# Patient Record
Sex: FEMALE | Race: BLACK OR AFRICAN AMERICAN | NOT HISPANIC OR LATINO | Employment: STUDENT | URBAN - METROPOLITAN AREA
[De-identification: names, ages, dates, MRNs, and addresses within clinical notes are randomized per-mention and may not be internally consistent; named-entity substitution may affect disease eponyms.]

---

## 2018-07-07 ENCOUNTER — HOSPITAL ENCOUNTER (EMERGENCY)
Facility: HOSPITAL | Age: 23
Discharge: HOME | End: 2018-07-07
Attending: EMERGENCY MEDICINE
Payer: COMMERCIAL

## 2018-07-07 VITALS
HEIGHT: 69 IN | RESPIRATION RATE: 20 BRPM | OXYGEN SATURATION: 100 % | BODY MASS INDEX: 7.64 KG/M2 | SYSTOLIC BLOOD PRESSURE: 118 MMHG | TEMPERATURE: 98 F | HEART RATE: 91 BPM | WEIGHT: 51.56 LBS | DIASTOLIC BLOOD PRESSURE: 67 MMHG

## 2018-07-07 DIAGNOSIS — R10.13 ABDOMINAL PAIN, CHRONIC, EPIGASTRIC: Primary | ICD-10-CM

## 2018-07-07 DIAGNOSIS — G89.29 ABDOMINAL PAIN, CHRONIC, EPIGASTRIC: Primary | ICD-10-CM

## 2018-07-07 PROCEDURE — 99283 EMERGENCY DEPT VISIT LOW MDM: CPT

## 2018-07-07 PROCEDURE — 25000003 PHARM REV CODE 250: Performed by: EMERGENCY MEDICINE

## 2018-07-07 RX ORDER — FAMOTIDINE 20 MG/1
20 TABLET, FILM COATED ORAL
Status: COMPLETED | OUTPATIENT
Start: 2018-07-07 | End: 2018-07-07

## 2018-07-07 RX ADMIN — FAMOTIDINE 20 MG: 20 TABLET ORAL at 02:07

## 2018-07-07 RX ADMIN — LIDOCAINE HYDROCHLORIDE 50 ML: 20 SOLUTION ORAL; TOPICAL at 02:07

## 2018-07-07 NOTE — ED PROVIDER NOTES
"SCRIBE #1 NOTE: I, Devante Eagle, am scribing for, and in the presence of, Earnestine Coronel MD. I have scribed the entire note.      History      Chief Complaint   Patient presents with    Abdominal Pain     Pt states, "I have this pain in my stomach and lower back and I went to urgent care and they did a urine test and x ray and couldn't find anything so they told me to come here."       Review of patient's allergies indicates:  Allergies no known allergies     HPI   HPI    7/7/2018, 2:22 PM   History obtained from the patient      History of Present Illness: Evonne Caputo is a 23 y.o. female patient who presents to the Emergency Department for chronic epigastric abd pain which onset a year ago. Pt reports that she has been to multiple doctors, without any dx. She reports being sent to the ED from urgent care today. She states she was given a "clear" urine test, but did not bring results with her. Sxs are intermittent and moderate in severity. There are no mitigating or exacerbating factors noted. Associated sxs include back pain. Back pain has been chronic for 3 months. Pt denies any dysuria, hematuria, hematochezia, anal bleeding, n/v/d, constipation, fever, chills, saddle anesthesia, focal weakness/numbness, and all other sxs at this time. Pt denies attempting to treat sxs. No further complaints or concerns at this time.           Arrival mode: Personal vehicle    PCP: Primary Doctor No       Past Medical History:  History reviewed. Nothing pertinent.     Past Surgical History:  History reviewed. Nothing pertinent.    Family History:  History reviewed. Nothing pertinent.    Social History:  Social History     Social History Main Topics    Smoking status: Unknown    Smokeless tobacco: Unknown    Alcohol use Unknown    Drug use: Unknown    Sexual activity: Unknown       ROS   Review of Systems   Constitutional: Negative for chills and fever.   HENT: Negative for sore throat.  "   Respiratory: Negative for shortness of breath.    Cardiovascular: Negative for chest pain.   Gastrointestinal: Positive for abdominal pain (epigastric). Negative for anal bleeding, blood in stool, constipation, diarrhea, nausea and vomiting.   Genitourinary: Negative for difficulty urinating, dysuria and hematuria.   Musculoskeletal: Positive for back pain.   Skin: Negative for rash.   Neurological: Negative for weakness and numbness.        (-) Saddle anesthesia   Hematological: Does not bruise/bleed easily.   All other systems reviewed and are negative.      Physical Exam      Initial Vitals [07/07/18 1416]   BP Pulse Resp Temp SpO2   129/76 93 20 98 °F (36.7 °C) 99 %      MAP       --          Physical Exam  Nursing Notes and Vital Signs Reviewed.  Constitutional: Patient is in no acute distress. Well-developed and well-nourished.  Head: Atraumatic. Normocephalic.  Eyes: PERRL. EOM intact. Conjunctivae are not pale. No scleral icterus.  ENT: Mucous membranes are moist. Oropharynx is clear and symmetric.    Neck: Supple. Full ROM. No lymphadenopathy.  Cardiovascular: Regular rate. Regular rhythm. No murmurs, rubs, or gallops. Distal pulses are 2+ and symmetric.  Pulmonary/Chest: No respiratory distress. Clear to auscultation bilaterally. No wheezing or rales.  Abdominal: Soft and non-distended.  There is no tenderness.  No rebound, guarding, or rigidity. Good bowel sounds. Benign exam.  Back: There is no tenderness. No midline bony tenderness, deformities, or step-offs of the T-spine or L-spine.   Genitourinary: No CVA tenderness  Musculoskeletal: Moves all extremities. No obvious deformities. No edema. No calf tenderness.  Skin: Warm and dry.  Neurological:  Alert, awake, and appropriate.  Normal speech.  No acute focal neurological deficits are appreciated.  Psychiatric: Normal affect. Good eye contact. Appropriate in content.    ED Course    Procedures  ED Vital Signs:  Vitals:    07/07/18 1416   BP: 129/76  "  Pulse: 93   Resp: 20   Temp: 98 °F (36.7 °C)   TempSrc: Oral   SpO2: 99%   Weight: 23.4 kg (51 lb 9.4 oz)   Height: 5' 9" (1.753 m)       Abnormal Lab Results:  Labs Reviewed - No data to display     All Lab Results:  No results found for this or any previous visit.      Imaging Results:  Imaging Results    None                 The Emergency Provider reviewed the vital signs and test results, which are outlined above.    ED Discussion     2:45 PM: Spoke with Urgent Care on the phone. Pt's UPT was negative.      2:55 PM: Reassessed pt at this time. Pt states her condition has improved at this time. Discussed with pt all pertinent ED information and results. Discussed pt dx and plan of tx. Gave pt all f/u and return to the ED instructions. All questions and concerns were addressed at this time. Pt expresses understanding of information and instructions, and is comfortable with plan to discharge. Pt is stable for discharge.    I discussed with patient and/or family/caretaker that evaluation in the ED does not suggest any emergent or life threatening medical conditions requiring immediate intervention beyond what was provided in the ED, and I believe patient is safe for discharge.  Regardless, an unremarkable evaluation in the ED does not preclude the development or presence of a serious of life threatening condition. As such, patient was instructed to return immediately for any worsening or change in current symptoms.      ED Medication(s):  Medications   famotidine tablet 20 mg (20 mg Oral Given 7/7/18 1441)   GI cocktail (mylanta 30 mL, lidocaine 2 % viscous 10 mL, dicyclomine 10 mL) 50 mL (50 mLs Oral Given 7/7/18 1442)       New Prescriptions    No medications on file       Follow-up Information     [facility]. Schedule an appointment as soon as possible for a visit in 2 days.    Specialty:  Gastroenterology  Why:  Return to the Emergency Room, If symptoms worsen  Contact information:  [address] [address] " [facility] [address]  [phone]                   Medical Decision Making              Scribe Attestation:   Scribe #1: I performed the above scribed service and the documentation accurately describes the services I performed. I attest to the accuracy of the note.    Attending:   Physician Attestation Statement for Scribe #1: I, Earnestine Coronel MD, personally performed the services described in this documentation, as scribed by Devante Eagle, in my presence, and it is both accurate and complete.          Clinical Impression       ICD-10-CM ICD-9-CM   1. Abdominal pain, chronic, epigastric R10.13 789.06    G89.29 338.29       Disposition:   Disposition: Discharged  Condition: Stable         Earnestine Coronel MD  07/08/18 0650

## 2018-07-09 ENCOUNTER — HOSPITAL ENCOUNTER (OUTPATIENT)
Dept: RADIOLOGY | Facility: HOSPITAL | Age: 23
Discharge: HOME | End: 2018-07-09
Attending: INTERNAL MEDICINE
Payer: COMMERCIAL

## 2018-07-09 ENCOUNTER — OFFICE VISIT (OUTPATIENT)
Dept: GASTROENTEROLOGY | Facility: CLINIC | Age: 23
End: 2018-07-09
Payer: COMMERCIAL

## 2018-07-09 VITALS
WEIGHT: 119.25 LBS | HEART RATE: 78 BPM | HEIGHT: 69 IN | SYSTOLIC BLOOD PRESSURE: 116 MMHG | BODY MASS INDEX: 17.66 KG/M2 | DIASTOLIC BLOOD PRESSURE: 64 MMHG

## 2018-07-09 DIAGNOSIS — R10.13 EPIGASTRIC PAIN: ICD-10-CM

## 2018-07-09 DIAGNOSIS — R10.13 EPIGASTRIC PAIN: Primary | ICD-10-CM

## 2018-07-09 PROCEDURE — 99244 OFF/OP CNSLTJ NEW/EST MOD 40: CPT | Mod: S$GLB,,, | Performed by: INTERNAL MEDICINE

## 2018-07-09 PROCEDURE — 76705 ECHO EXAM OF ABDOMEN: CPT | Mod: TC

## 2018-07-09 PROCEDURE — 99999 PR PBB SHADOW E&M-EST. PATIENT-LVL III: CPT | Mod: PBBFAC,,, | Performed by: INTERNAL MEDICINE

## 2018-07-09 RX ORDER — LEVOTHYROXINE SODIUM 125 UG/1
125 TABLET ORAL DAILY
COMMUNITY

## 2018-07-09 NOTE — PATIENT INSTRUCTIONS
Epigastric pain  -     Case request GI: ESOPHAGOGASTRODUODENOSCOPY (EGD)  -     CBC auto differential; Future; Expected date: 07/09/2018  -     Basic metabolic panel; Future; Expected date: 07/09/2018  -     Hepatic function panel; Future; Expected date: 07/09/2018  -     US Abdomen Limited (GALLBLADDER); Future; Expected date: 07/09/2018      Thanks for trusting us with your healthcare needs and using [facility]. If you want to ask us a question, you can do so by replying to this message or by calling [phone].    Sincerely,    Roberto Araiza M.D.      To rate your experience with Dr. Araiza please click on the link below:    [email or web address]

## 2018-07-09 NOTE — LETTER
July 9, 2018      Earnestine Coronel MD  [id number] [place] [address] [address] [place] [address]           [facility]  [address] [facility] [address] [place] [address]  Phone: [phone]  Fax: [phone]          Patient: Evonne Caputo   MR Number: [id number]   YOB: 1995   Date of Visit: 7/9/2018       Dear Dr. Earnestine Coronel:    Thank you for referring Evonne Caputo to me for evaluation. Attached you will find relevant portions of my assessment and plan of care.    If you have questions, please do not hesitate to call me. I look forward to following Evonne Caputo along with you.    Sincerely,    Roberto Araiza MD    Enclosure  CC:  No Recipients    If you would like to receive this communication electronically, please contact [email or web address] or [phone] to request more information on [employer] Link access.    For providers and/or their staff who would like to refer a patient to [facility], please contact us through our one-stop-shop provider referral line, [facility], at [phone].    If you feel you have received this communication in error or would no longer like to receive these types of communications, please e-mail [email or web address]

## 2018-07-09 NOTE — PROGRESS NOTES
[facility] [place]  Gastroenterology    Patient evaluated at the request of Earnestine Coronel MD  [id number] [place] [address] [place] [address]    PCP: Primary Doctor No    7/9/18      Subjective:   Abdominal Pain: Patient complains of abdominal pain. The pain is located in the epigastrium. The pain is described as sharp, and is 7/10 in intensity. Onset was 1 year ago. Symptoms have been unchanged since. Aggravating factors include movement.  Alleviating factors include recumbency. Associated symptoms include constipation and nausea. The patient denies anorexia, arthralgias, belching, chills, diarrhea, fever, headache, hematochezia, melena and vomiting. Symptoms improve with recumbency.      Past Medical History:   Diagnosis Date    Hashimoto's disease        No past surgical history on file.    No current outpatient prescriptions on file prior to visit.     No current facility-administered medications on file prior to visit.        Review of patient's allergies indicates:  No Known Allergies    Social History     Social History    Marital status: Single     Spouse name: N/A    Number of children: N/A    Years of education: N/A     Occupational History    Not on file.     Social History Main Topics    Smoking status: Never Smoker    Smokeless tobacco: Never Used    Alcohol use Yes      Comment: occasional     Drug use: No    Sexual activity: Yes     Other Topics Concern    Not on file     Social History Narrative    No narrative on file       Family History   Problem Relation Age of Onset    Colon cancer Neg Hx     Stomach cancer Neg Hx      Review of Systems   Constitutional: Negative for chills, fever, malaise/fatigue and weight loss.   HENT: Negative for congestion, hearing loss, nosebleeds, sinus pain and sore throat.    Eyes: Negative for blurred vision, double vision, pain and redness.   Respiratory: Negative for hemoptysis, sputum production, shortness of breath and wheezing.     Cardiovascular: Negative for chest pain, palpitations, orthopnea, claudication and leg swelling.   Gastrointestinal: Positive for abdominal pain and nausea. Negative for blood in stool, constipation, diarrhea, heartburn, melena and vomiting.   Genitourinary: Negative for dysuria, frequency, hematuria and urgency.   Musculoskeletal: Negative for back pain, joint pain, myalgias and neck pain.   Skin: Negative for itching and rash.   Neurological: Positive for weakness. Negative for dizziness, tingling, tremors, sensory change, seizures and headaches.   Endo/Heme/Allergies: Negative for environmental allergies and polydipsia. Does not bruise/bleed easily.   Psychiatric/Behavioral: Negative for depression, hallucinations, memory loss and suicidal ideas. The patient is not nervous/anxious.        Objective:   Vitals:   Vitals:    07/09/18 1124   BP: 116/64   Pulse: 78       Physical Exam   Constitutional: She is oriented to person, place, and time. She appears well-developed and well-nourished.   HENT:   Head: Normocephalic and atraumatic.   Eyes: EOM are normal. Pupils are equal, round, and reactive to light.   Neck: Normal range of motion. Neck supple. No thyromegaly present.   Cardiovascular: Normal rate, regular rhythm, normal heart sounds and intact distal pulses.    No murmur heard.  Pulmonary/Chest: Effort normal and breath sounds normal. No respiratory distress. She has no wheezes. She has no rales.   Abdominal: Soft. Bowel sounds are normal. She exhibits no distension and no mass. There is no tenderness.   Musculoskeletal: Normal range of motion. She exhibits no edema or tenderness.   Lymphadenopathy:     She has no cervical adenopathy.   Neurological: She is alert and oriented to person, place, and time. She has normal reflexes. No cranial nerve deficit.   Skin: Skin is warm and dry. No erythema.   Psychiatric: She has a normal mood and affect. Her behavior is normal.         IMPRESSION     Problem List Items  Addressed This Visit     None      Visit Diagnoses     Epigastric pain    -  Primary    Relevant Orders    Case request GI: ESOPHAGOGASTRODUODENOSCOPY (EGD) (Completed)    CBC auto differential    Basic metabolic panel    Hepatic function panel    US Abdomen Limited (GALLBLADDER)          PLANS:    Epigastric pain  -     Case request GI: ESOPHAGOGASTRODUODENOSCOPY (EGD)  -     CBC auto differential; Future; Expected date: 07/09/2018  -     Basic metabolic panel; Future; Expected date: 07/09/2018  -     Hepatic function panel; Future; Expected date: 07/09/2018  -     US Abdomen Limited (GALLBLADDER); Future; Expected date: 07/09/2018      Risks, benefits and alternative options were discussed with the patient. Risks including but not limited to infection, bleeding, heart or respiratory problems and perforation that may require surgery were all explained to the patient. The patient had a chance for questions if there were doubts or concerns about the test. The referring provider will be notified that our consultation is complete and available through the patient's records.    Thanks for letting us participate in the care of this nice patient,      Roberto Araiza

## 2018-07-12 PROBLEM — R10.13 EPIGASTRIC PAIN: Status: ACTIVE | Noted: 2018-07-12

## 2018-07-12 RX ORDER — SODIUM CHLORIDE 0.9 % (FLUSH) 0.9 %
3 SYRINGE (ML) INJECTION
Status: CANCELLED | OUTPATIENT
Start: 2018-07-12

## 2018-07-13 ENCOUNTER — HOSPITAL ENCOUNTER (OUTPATIENT)
Facility: HOSPITAL | Age: 23
Discharge: HOME | End: 2018-07-13
Attending: INTERNAL MEDICINE | Admitting: INTERNAL MEDICINE
Payer: COMMERCIAL

## 2018-07-13 ENCOUNTER — ANESTHESIA (OUTPATIENT)
Dept: ENDOSCOPY | Facility: HOSPITAL | Age: 23
End: 2018-07-13
Payer: COMMERCIAL

## 2018-07-13 ENCOUNTER — ANESTHESIA EVENT (OUTPATIENT)
Dept: ENDOSCOPY | Facility: HOSPITAL | Age: 23
End: 2018-07-13
Payer: COMMERCIAL

## 2018-07-13 ENCOUNTER — SURGERY (OUTPATIENT)
Age: 23
End: 2018-07-13

## 2018-07-13 DIAGNOSIS — R10.9 ABDOMINAL PAIN: ICD-10-CM

## 2018-07-13 DIAGNOSIS — R10.13 EPIGASTRIC PAIN: Primary | ICD-10-CM

## 2018-07-13 LAB
B-HCG UR QL: NEGATIVE
CTP QC/QA: YES

## 2018-07-13 PROCEDURE — 88305 TISSUE EXAM BY PATHOLOGIST: CPT | Performed by: PATHOLOGY

## 2018-07-13 PROCEDURE — 43239 EGD BIOPSY SINGLE/MULTIPLE: CPT | Mod: ,,, | Performed by: INTERNAL MEDICINE

## 2018-07-13 PROCEDURE — 88305 TISSUE EXAM BY PATHOLOGIST: CPT | Mod: 26,,, | Performed by: PATHOLOGY

## 2018-07-13 PROCEDURE — 63600175 PHARM REV CODE 636 W HCPCS: Performed by: NURSE ANESTHETIST, CERTIFIED REGISTERED

## 2018-07-13 PROCEDURE — 25000003 PHARM REV CODE 250: Performed by: INTERNAL MEDICINE

## 2018-07-13 PROCEDURE — 27201012 HC FORCEPS, HOT/COLD, DISP: Performed by: INTERNAL MEDICINE

## 2018-07-13 PROCEDURE — 25000003 PHARM REV CODE 250: Performed by: NURSE ANESTHETIST, CERTIFIED REGISTERED

## 2018-07-13 PROCEDURE — 81025 URINE PREGNANCY TEST: CPT | Performed by: INTERNAL MEDICINE

## 2018-07-13 PROCEDURE — 43239 EGD BIOPSY SINGLE/MULTIPLE: CPT | Performed by: INTERNAL MEDICINE

## 2018-07-13 PROCEDURE — 37000008 HC ANESTHESIA 1ST 15 MINUTES: Performed by: INTERNAL MEDICINE

## 2018-07-13 RX ORDER — LIDOCAINE HYDROCHLORIDE 10 MG/ML
INJECTION INFILTRATION; PERINEURAL
Status: DISCONTINUED | OUTPATIENT
Start: 2018-07-13 | End: 2018-07-13

## 2018-07-13 RX ORDER — PROPOFOL 10 MG/ML
VIAL (ML) INTRAVENOUS
Status: DISCONTINUED | OUTPATIENT
Start: 2018-07-13 | End: 2018-07-13

## 2018-07-13 RX ORDER — SODIUM CHLORIDE, SODIUM LACTATE, POTASSIUM CHLORIDE, CALCIUM CHLORIDE 600; 310; 30; 20 MG/100ML; MG/100ML; MG/100ML; MG/100ML
INJECTION, SOLUTION INTRAVENOUS CONTINUOUS
Status: DISCONTINUED | OUTPATIENT
Start: 2018-07-13 | End: 2018-07-13 | Stop reason: HOSPADM

## 2018-07-13 RX ADMIN — PROPOFOL 100 MG: 10 INJECTION, EMULSION INTRAVENOUS at 03:07

## 2018-07-13 RX ADMIN — SODIUM CHLORIDE, SODIUM LACTATE, POTASSIUM CHLORIDE, AND CALCIUM CHLORIDE: .6; .31; .03; .02 INJECTION, SOLUTION INTRAVENOUS at 02:07

## 2018-07-13 RX ADMIN — LIDOCAINE HYDROCHLORIDE 40 MG: 10 INJECTION, SOLUTION INFILTRATION; PERINEURAL at 03:07

## 2018-07-13 NOTE — ANESTHESIA POSTPROCEDURE EVALUATION
"Anesthesia Post Evaluation    Patient: Evonne Caputo    Procedure(s) Performed: Procedure(s) (LRB):  ESOPHAGOGASTRODUODENOSCOPY (EGD) (Left)    Final Anesthesia Type: MAC  Patient location during evaluation: GI PACU  Patient participation: Yes- Able to Participate  Level of consciousness: awake and alert and oriented  Post-procedure vital signs: reviewed and stable  Pain management: adequate  Airway patency: patent  PONV status at discharge: No PONV  Anesthetic complications: no      Cardiovascular status: hemodynamically stable and blood pressure returned to baseline  Respiratory status: unassisted, spontaneous ventilation and room air  Hydration status: euvolemic  Follow-up not needed.        Visit Vitals  /86   Pulse 75   Temp 36.4 °C (97.5 °F) (Oral)   Resp 16   Ht 5' 9" (1.753 m)   Wt 52.6 kg (116 lb)   LMP 06/20/2018   SpO2 100%   Breastfeeding? No   BMI 17.13 kg/m²       Pain/Lary Score: Pain Assessment Performed: Yes (7/13/2018  2:48 PM)  Presence of Pain: denies (7/13/2018  2:48 PM)      "

## 2018-07-13 NOTE — [employer] PATIENT INSTRUCTIONS
Discharge Summary/Instructions after an Endoscopic Procedure  Patient Name: Evonne Caputo  Patient MRN: [id number]  Patient YOB: 1995 Friday, July 13, 2018 Roberto Araiza MD  RESTRICTIONS:  During your procedure today, you received medications for sedation.  These   medications may affect your judgment, balance and coordination.  Therefore,   for 24 hours, you have the following restrictions:   - DO NOT drive a car, operate machinery, make legal/financial decisions,   sign important papers or drink alcohol.    ACTIVITY:  Today: no heavy lifting, straining or running due to procedural   sedation/anesthesia.  The following day: return to full activity including work.  DIET:  Eat and drink normally unless instructed otherwise.     TREATMENT FOR COMMON SIDE EFFECTS:  - Mild abdominal pain, nausea, belching, bloating or excessive gas:  rest,   eat lightly and use a heating pad.  - Sore Throat: treat with throat lozenges and/or gargle with warm salt   water.  - Because air was used during the procedure, expelling large amounts of air   from your rectum or belching is normal.  - If a bowel prep was taken, you may not have a bowel movement for 1-3 days.    This is normal.  SYMPTOMS TO WATCH FOR AND REPORT TO YOUR PHYSICIAN:  1. Abdominal pain or bloating, other than gas cramps.  2. Chest pain.  3. Back pain.  4. Signs of infection such as: chills or fever occurring within 24 hours   after the procedure.  5. Rectal bleeding, which would show as bright red, maroon, or black stools.   (A tablespoon of blood from the rectum is not serious, especially if   hemorrhoids are present.)  6. Vomiting.  7. Weakness or dizziness.  GO DIRECTLY TO THE NEAREST EMERGENCY ROOM IF YOU HAVE ANY OF THE FOLLOWING:      Difficulty breathing              Chills and/or fever over 101 F   Persistent vomiting and/or vomiting blood   Severe abdominal pain   Severe chest pain   Black, tarry stools   Bleeding- more than one tablespoon   Any  other symptom or condition that you feel may need urgent attention  Your doctor recommends these additional instructions:  If any biopsies were taken, your doctors clinic will contact you in 1 to 2   weeks with any results.  - The patient will be observed post-procedure, until all discharge criteria   are met.   - Discharge patient to home (ambulatory).   - Patient has a contact number available for emergencies.  The signs and   symptoms of potential delayed complications were discussed with the   patient.  Return to normal activities tomorrow.  Written discharge   instructions were provided to the patient.   - Resume previous diet.   - Continue present medications.   - Await pathology results.   - No repeat upper endoscopy.   - Return to referring physician as previously scheduled.  For questions, problems or results please call your physician Roberto Araiza MD at Work:  [phone]  If you have any questions about the above instructions, call the GI   department at [phone] or call the endoscopy unit at [phone]   from 7am until 3 pm.  [facility], EMERGENCY ROOM PHONE NUMBER:   [phone]  IF A COMPLICATION OR EMERGENCY SITUATION ARISES AND YOU ARE UNABLE TO REACH   YOUR PHYSICIAN - GO DIRECTLY TO THE EMERGENCY ROOM.  I have read or have had read to me these discharge instructions for my   procedure and have received a written copy.  I understand these   instructions and will follow-up with my physician if I have any questions.     __________________________________       _____________________________________  Nurse Signature                                          Patient/Designated   Responsible Party Signature  Roberto Araiza MD  7/13/2018 3:58:27 PM  This report has been verified and signed electronically.  [employer]

## 2018-07-13 NOTE — TRANSFER OF CARE
"Anesthesia Transfer of Care Note    Patient: Evonne Caputo    Procedure(s) Performed: Procedure(s) (LRB):  ESOPHAGOGASTRODUODENOSCOPY (EGD) (Left)    Patient location: Other: GI PACU    Anesthesia Type: MAC    Transport from OR: Transported from OR on room air with adequate spontaneous ventilation    Post pain: adequate analgesia    Post assessment: no apparent anesthetic complications    Post vital signs: stable    Level of consciousness: awake    Nausea/Vomiting: no nausea/vomiting    Complications: none    Transfer of care protocol was followed      Last vitals:   Visit Vitals  /86   Pulse 75   Temp 36.4 °C (97.5 °F) (Oral)   Resp 16   Ht 5' 9" (1.753 m)   Wt 52.6 kg (116 lb)   LMP 06/20/2018   SpO2 100%   Breastfeeding? No   BMI 17.13 kg/m²     "

## 2018-07-13 NOTE — INTERVAL H&P NOTE
The patient has been examined and the H&P has been reviewed:    I concur with the findings and no changes have occurred since H&P was written.    Anesthesia/Surgery risks, benefits and alternative options discussed and understood by patient/family.          Active Hospital Problems    Diagnosis  POA    *Epigastric pain [R10.13]  Yes      Resolved Hospital Problems    Diagnosis Date Resolved POA   No resolved problems to display.

## 2018-07-13 NOTE — ANESTHESIA RELEASE NOTE
"Anesthesia Release from PACU Note    Patient: Evonne Caputo    Procedure(s) Performed: Procedure(s) (LRB):  ESOPHAGOGASTRODUODENOSCOPY (EGD) (Left)    Anesthesia type: MAC    Post pain: Adequate analgesia    Post assessment: no apparent anesthetic complications, tolerated procedure well and no evidence of recall    Last Vitals:   Visit Vitals  /86   Pulse 75   Temp 36.4 °C (97.5 °F) (Oral)   Resp 16   Ht 5' 9" (1.753 m)   Wt 52.6 kg (116 lb)   LMP 06/20/2018   SpO2 100%   Breastfeeding? No   BMI 17.13 kg/m²       Post vital signs: stable    Level of consciousness: awake, alert  and oriented    Nausea/Vomiting: no nausea/no vomiting    Complications: none    Airway Patency: patent    Respiratory: unassisted, spontaneous ventilation, room air    Cardiovascular: stable and blood pressure at baseline    Hydration: euvolemic  "

## 2018-07-13 NOTE — DISCHARGE INSTRUCTIONS
If you develop fevers, severe abdominal pain, significant bleeding, nausea or vomiting, please contact us or come to our Emergency Department at [facility].  Our  contact number is [phone] available for emergencies or any question that you may have.      You may return to normal activities tomorrow.  Written discharge instructions were provided to you today and have them handy since you may not remember our conversation today.       I also recommend that you follow a high fiber diet and take calcium supplements daily as well as to continue your present medications.      If you take Aspirin, please resume taking it at your prior dose today. If we obtained biopsies or removed polyps during your procedure, we will contact you within 5 to 6 days with the results.      Thanks for trusting us with your healthcare needs.      Sincerely,     Roberto Araiza M.D.        To rate your experience with Dr. Araiza, please click on the link below     [email or web address]

## 2018-07-13 NOTE — H&P (VIEW-ONLY)
[facility]  Gastroenterology    Patient evaluated at the request of Earnestine Coronel MD  [id number] [place] [address] [place] [id number]    PCP: Primary Doctor No    7/9/18      Subjective:   Abdominal Pain: Patient complains of abdominal pain. The pain is located in the epigastrium. The pain is described as sharp, and is 7/10 in intensity. Onset was 1 year ago. Symptoms have been unchanged since. Aggravating factors include movement.  Alleviating factors include recumbency. Associated symptoms include constipation and nausea. The patient denies anorexia, arthralgias, belching, chills, diarrhea, fever, headache, hematochezia, melena and vomiting. Symptoms improve with recumbency.      Past Medical History:   Diagnosis Date    Hashimoto's disease        No past surgical history on file.    No current outpatient prescriptions on file prior to visit.     No current facility-administered medications on file prior to visit.        Review of patient's allergies indicates:  No Known Allergies    Social History     Social History    Marital status: Single     Spouse name: N/A    Number of children: N/A    Years of education: N/A     Occupational History    Not on file.     Social History Main Topics    Smoking status: Never Smoker    Smokeless tobacco: Never Used    Alcohol use Yes      Comment: occasional     Drug use: No    Sexual activity: Yes     Other Topics Concern    Not on file     Social History Narrative    No narrative on file       Family History   Problem Relation Age of Onset    Colon cancer Neg Hx     Stomach cancer Neg Hx      Review of Systems   Constitutional: Negative for chills, fever, malaise/fatigue and weight loss.   HENT: Negative for congestion, hearing loss, nosebleeds, sinus pain and sore throat.    Eyes: Negative for blurred vision, double vision, pain and redness.   Respiratory: Negative for hemoptysis, sputum production, shortness of breath and wheezing.     Cardiovascular: Negative for chest pain, palpitations, orthopnea, claudication and leg swelling.   Gastrointestinal: Positive for abdominal pain and nausea. Negative for blood in stool, constipation, diarrhea, heartburn, melena and vomiting.   Genitourinary: Negative for dysuria, frequency, hematuria and urgency.   Musculoskeletal: Negative for back pain, joint pain, myalgias and neck pain.   Skin: Negative for itching and rash.   Neurological: Positive for weakness. Negative for dizziness, tingling, tremors, sensory change, seizures and headaches.   Endo/Heme/Allergies: Negative for environmental allergies and polydipsia. Does not bruise/bleed easily.   Psychiatric/Behavioral: Negative for depression, hallucinations, memory loss and suicidal ideas. The patient is not nervous/anxious.        Objective:   Vitals:   Vitals:    07/09/18 1124   BP: 116/64   Pulse: 78       Physical Exam   Constitutional: She is oriented to person, place, and time. She appears well-developed and well-nourished.   HENT:   Head: Normocephalic and atraumatic.   Eyes: EOM are normal. Pupils are equal, round, and reactive to light.   Neck: Normal range of motion. Neck supple. No thyromegaly present.   Cardiovascular: Normal rate, regular rhythm, normal heart sounds and intact distal pulses.    No murmur heard.  Pulmonary/Chest: Effort normal and breath sounds normal. No respiratory distress. She has no wheezes. She has no rales.   Abdominal: Soft. Bowel sounds are normal. She exhibits no distension and no mass. There is no tenderness.   Musculoskeletal: Normal range of motion. She exhibits no edema or tenderness.   Lymphadenopathy:     She has no cervical adenopathy.   Neurological: She is alert and oriented to person, place, and time. She has normal reflexes. No cranial nerve deficit.   Skin: Skin is warm and dry. No erythema.   Psychiatric: She has a normal mood and affect. Her behavior is normal.         IMPRESSION     Problem List Items  Addressed This Visit     None      Visit Diagnoses     Epigastric pain    -  Primary    Relevant Orders    Case request GI: ESOPHAGOGASTRODUODENOSCOPY (EGD) (Completed)    CBC auto differential    Basic metabolic panel    Hepatic function panel    US Abdomen Limited (GALLBLADDER)          PLANS:    Epigastric pain  -     Case request GI: ESOPHAGOGASTRODUODENOSCOPY (EGD)  -     CBC auto differential; Future; Expected date: 07/09/2018  -     Basic metabolic panel; Future; Expected date: 07/09/2018  -     Hepatic function panel; Future; Expected date: 07/09/2018  -     US Abdomen Limited (GALLBLADDER); Future; Expected date: 07/09/2018      Risks, benefits and alternative options were discussed with the patient. Risks including but not limited to infection, bleeding, heart or respiratory problems and perforation that may require surgery were all explained to the patient. The patient had a chance for questions if there were doubts or concerns about the test. The referring provider will be notified that our consultation is complete and available through the patient's records.    Thanks for letting us participate in the care of this nice patient,      Roberto Araiza

## 2018-07-13 NOTE — ANESTHESIA PREPROCEDURE EVALUATION
07/13/2018  Evonne Caputo is a 23 y.o., female.    Anesthesia Evaluation    I have reviewed the Patient Summary Reports.    I have reviewed the Nursing Notes.   I have reviewed the Medications.     Review of Systems  Anesthesia Hx:  No problems with previous Anesthesia Denies Hx of Anesthetic complications  History of prior surgery of interest to airway management or planning: Previous anesthesia: General Denies Family Hx of Anesthesia complications.   Denies Personal Hx of Anesthesia complications.   Social:  Non-Smoker, Social Alcohol Use    Hematology/Oncology:  Hematology Normal   Oncology Normal     EENT/Dental:EENT/Dental Normal   Cardiovascular:  Cardiovascular Normal     Pulmonary:  Pulmonary Normal    Renal/:  Renal/ Normal     Hepatic/GI:  Hepatic/GI Normal    Musculoskeletal:  Musculoskeletal Normal    Neurological:  Neurology Normal    Endocrine:   Hypothyroidism    Dermatological:  Skin Normal    Psych:  Psychiatric Normal           Physical Exam  General:  Well nourished    Airway/Jaw/Neck:  Airway Findings: Mouth Opening: Normal Tongue: Normal  General Airway Assessment: Adult  Mallampati: I  TM Distance: Normal, at least 6 cm      Dental:  Dental Findings: In tact   Chest/Lungs:  Chest/Lungs Findings: Clear to auscultation, Normal Respiratory Rate     Heart/Vascular:  Heart Findings: Rate: Normal  Rhythm: Regular Rhythm  Sounds: Normal        Mental Status:  Mental Status Findings:  Cooperative, Alert and Oriented         Anesthesia Plan  Type of Anesthesia, risks & benefits discussed:  Anesthesia Type:  MAC  Patient's Preference:   Intra-op Monitoring Plan: standard ASA monitors  Intra-op Monitoring Plan Comments:   Post Op Pain Control Plan:   Post Op Pain Control Plan Comments:   Induction:   IV  Beta Blocker:  Patient is not currently on a Beta-Blocker (No further  documentation required).       Informed Consent: Patient understands risks and agrees with Anesthesia plan.  Questions answered. Anesthesia consent signed with patient.  ASA Score: 2     Day of Surgery Review of History & Physical: I have interviewed and examined the patient. I have reviewed the patient's H&P dated:            Ready For Surgery From Anesthesia Perspective.

## 2018-07-16 VITALS
RESPIRATION RATE: 18 BRPM | HEART RATE: 83 BPM | WEIGHT: 116 LBS | HEIGHT: 69 IN | DIASTOLIC BLOOD PRESSURE: 74 MMHG | SYSTOLIC BLOOD PRESSURE: 124 MMHG | TEMPERATURE: 98 F | BODY MASS INDEX: 17.18 KG/M2 | OXYGEN SATURATION: 100 %

## 2018-07-19 NOTE — PROGRESS NOTES
Please let the patient know that biopsies taken during procedure are essentially OK. No further action is needed.     FINAL PATHOLOGIC DIAGNOSIS  Antrum biopsy:  Antral mucosa with mild reactive gastropathy;  H. pylori is not identified on H&E-stained sections.    Roberto Araiza M.D.

## 2022-09-02 ENCOUNTER — OFFICE VISIT (OUTPATIENT)
Dept: URGENT CARE | Facility: CLINIC | Age: 27
End: 2022-09-02
Payer: COMMERCIAL

## 2022-09-02 VITALS
RESPIRATION RATE: 17 BRPM | BODY MASS INDEX: 17.03 KG/M2 | TEMPERATURE: 99 F | DIASTOLIC BLOOD PRESSURE: 64 MMHG | WEIGHT: 115 LBS | OXYGEN SATURATION: 100 % | HEIGHT: 69 IN | SYSTOLIC BLOOD PRESSURE: 100 MMHG | HEART RATE: 90 BPM

## 2022-09-02 DIAGNOSIS — M54.9 MID BACK PAIN: Primary | ICD-10-CM

## 2022-09-02 PROCEDURE — 3078F DIAST BP <80 MM HG: CPT | Mod: CPTII,,, | Performed by: FAMILY MEDICINE

## 2022-09-02 PROCEDURE — 3008F PR BODY MASS INDEX (BMI) DOCUMENTED: ICD-10-PCS | Mod: CPTII,,, | Performed by: FAMILY MEDICINE

## 2022-09-02 PROCEDURE — 99202 OFFICE O/P NEW SF 15 MIN: CPT | Mod: ,,, | Performed by: FAMILY MEDICINE

## 2022-09-02 PROCEDURE — 3008F BODY MASS INDEX DOCD: CPT | Mod: CPTII,,, | Performed by: FAMILY MEDICINE

## 2022-09-02 PROCEDURE — 1160F PR REVIEW ALL MEDS BY PRESCRIBER/CLIN PHARMACIST DOCUMENTED: ICD-10-PCS | Mod: CPTII,,, | Performed by: FAMILY MEDICINE

## 2022-09-02 PROCEDURE — 3074F PR MOST RECENT SYSTOLIC BLOOD PRESSURE < 130 MM HG: ICD-10-PCS | Mod: CPTII,,, | Performed by: FAMILY MEDICINE

## 2022-09-02 PROCEDURE — 1159F PR MEDICATION LIST DOCUMENTED IN MEDICAL RECORD: ICD-10-PCS | Mod: CPTII,,, | Performed by: FAMILY MEDICINE

## 2022-09-02 PROCEDURE — 1160F RVW MEDS BY RX/DR IN RCRD: CPT | Mod: CPTII,,, | Performed by: FAMILY MEDICINE

## 2022-09-02 PROCEDURE — 99202 PR OFFICE/OUTPT VISIT, NEW, LEVL II, 15-29 MIN: ICD-10-PCS | Mod: ,,, | Performed by: FAMILY MEDICINE

## 2022-09-02 PROCEDURE — 3078F PR MOST RECENT DIASTOLIC BLOOD PRESSURE < 80 MM HG: ICD-10-PCS | Mod: CPTII,,, | Performed by: FAMILY MEDICINE

## 2022-09-02 PROCEDURE — 1159F MED LIST DOCD IN RCRD: CPT | Mod: CPTII,,, | Performed by: FAMILY MEDICINE

## 2022-09-02 PROCEDURE — 3074F SYST BP LT 130 MM HG: CPT | Mod: CPTII,,, | Performed by: FAMILY MEDICINE

## 2022-09-02 NOTE — PATIENT INSTRUCTIONS
Discussed the physical finding, concerns of musculoskeletal.  Monitor the symptoms closely.  Trial of Aleve or ibuprofen for pain.  With persistent symptoms may need further evaluation.  Call or return to clinic for any questions.  Stretching, topical heating pad as tolerated and as needed

## 2022-09-02 NOTE — PROGRESS NOTES
"Subjective:       Patient ID: Evonne Caputo is a 27 y.o. female.    Vitals:  height is 5' 9" (1.753 m) and weight is 52.2 kg (115 lb). Her temperature is 98.8 °F (37.1 °C). Her blood pressure is 100/64 and her pulse is 90. Her respiration is 17 and oxygen saturation is 100%.     Chief Complaint: Back Pain (Upper back pain 1 week, unsure of cause, no otc meds taken.)    HPI:  27-year-old female present to clinic with concerns of right side midback pain since 1 week.  No recent upper or lower respiratory infections, no fever, no shortness of breath.  No history of asthma.  On questioning admits of getting new mattress.  No similar complaints in the past no fall or trauma.  Does not recall any increased physical activity to start the pain  Patient has not tried any medications so far    ROS  :  Constitutional : No fever, no fatigue  Neck : Negative except HPI  Respiratory : No shortness of breath, no wheezing  Cardiovascular : No chest pain  Musculoskeletal : Negative except HPI  Integumentary : No rash, no abnormal lesion  Neurological : Negative for tingling numbness and weakness    Objective:      Physical Exam    General : Alert and oriented, No apparent distress, Afebrile  Neck -: supple, Non Tender  Respiratory :Lungs are clear to auscultate, Breath sounds are equal  Cardiovascular : Normal rate, normal volume pulse bilateral  Musculoskeletal :  Gait appears normal, spine no point tenderness, right midback mild tenderness to palpate, muscle strength appears equal bilateral lower and upper extremities  Integumentary : Warm, Dry and no rash  Neurologic : Alert and Oriented X 4, sensations intact, motor intact   Assessment:       1. Mid back pain        Plan:       Discussed the physical finding, concerns of musculoskeletal.  Monitor the symptoms closely.  Trial of Aleve or ibuprofen for pain.  With persistent symptoms may need further evaluation.  Call or return to clinic for any questions.  " Stretching, topical heating pad as tolerated and as needed  Mid back pain